# Patient Record
Sex: FEMALE | Race: WHITE | NOT HISPANIC OR LATINO | URBAN - METROPOLITAN AREA
[De-identification: names, ages, dates, MRNs, and addresses within clinical notes are randomized per-mention and may not be internally consistent; named-entity substitution may affect disease eponyms.]

---

## 2023-11-08 ENCOUNTER — TELEPHONE (OUTPATIENT)
Age: 68
End: 2023-11-08

## 2023-11-08 NOTE — TELEPHONE ENCOUNTER
Returned message - Attempted to reach pt at number - left a vm to call the office to assist / to schedule an NP appt. Pt left a VM re: appt - Transcribed VM:  phone number [phone]. Like to make an appointment - Thank you. Have a good day.

## 2023-11-09 NOTE — TELEPHONE ENCOUNTER
Second attempt to call pt to schedule. Left a vm to call the office. Pt left a VM re: appt - Transcribed VM  Good afternoon. I'm calling -  I would like to make an appointment with the [facility]. I can't pronounce her last name at your earliest convenience. Please call back at [phone]. Thank you.